# Patient Record
(demographics unavailable — no encounter records)

---

## 2024-10-24 NOTE — ASSESSMENT
[FreeTextEntry1] : HTN stable, will continue lisinopril 40mg daily  HLD continue to work on dietary changes at this time  Weight management Recommend adhering to a calorie restricted diet and keeping a food diary Recommend using calorie counting apps such as my net diary Recommend decreasing fried foods, junk foods, carbohydrates and sugars. Recommend increasing healthy proteins and fats in diet as well as fruits and vegetables. Encouraged patient to increase physical activity to 150 to 200 minutes a week  follow up in 3 months

## 2024-10-24 NOTE — HISTORY OF PRESENT ILLNESS
[de-identified] : 50 y/o male with pmhx of htn, obesity, and asthma presents for follow up. Patient was not able to start zepbound as it was not covered by his insurance. He did work on dietary and lifestyle changes and has lost 9 pounds since our last visit.  For HTN, he has been taking lisinopril 30 every other day alternating with lisinopril 40mg. He did not want to waste the 30 mg tablets. States his bp has been well controlled at home

## 2025-01-31 NOTE — ASSESSMENT
[FreeTextEntry1] : HTN stable, will continue lisinopril 40mg daily  HLD continue to work on dietary changes at this time  Prediabetes most recent hgba1c of 6.0, work on dietary changes at this time  follow up in 6 months for cpe

## 2025-01-31 NOTE — HEALTH RISK ASSESSMENT
[0] : 2) Feeling down, depressed, or hopeless: Not at all (0) [PHQ-2 Negative - No further assessment needed] : PHQ-2 Negative - No further assessment needed [[id number]] : 0 [Never] : Never

## 2025-01-31 NOTE — HISTORY OF PRESENT ILLNESS
Patient [FreeTextEntry1] : follow up of chronic medical conditions [de-identified] : 52 y/o male with pmhx of htn, obesity, and asthma presents for follow up. Patient overall doing well. No acute complaints. Requesting refill of medication. He has been working on diet changes and has had some weight loss.